# Patient Record
Sex: FEMALE | Race: WHITE | NOT HISPANIC OR LATINO | Employment: FULL TIME | URBAN - METROPOLITAN AREA
[De-identification: names, ages, dates, MRNs, and addresses within clinical notes are randomized per-mention and may not be internally consistent; named-entity substitution may affect disease eponyms.]

---

## 2023-06-04 ENCOUNTER — HOSPITAL ENCOUNTER (EMERGENCY)
Facility: HOSPITAL | Age: 28
Discharge: HOME | End: 2023-06-04
Attending: EMERGENCY MEDICINE | Admitting: EMERGENCY MEDICINE
Payer: COMMERCIAL

## 2023-06-04 VITALS
HEART RATE: 94 BPM | OXYGEN SATURATION: 100 % | HEIGHT: 60 IN | RESPIRATION RATE: 16 BRPM | WEIGHT: 132.5 LBS | BODY MASS INDEX: 26.01 KG/M2 | SYSTOLIC BLOOD PRESSURE: 119 MMHG | DIASTOLIC BLOOD PRESSURE: 75 MMHG | TEMPERATURE: 97.9 F

## 2023-06-04 DIAGNOSIS — K14.6 TONGUE PAIN: Primary | ICD-10-CM

## 2023-06-04 PROCEDURE — 99282 EMERGENCY DEPT VISIT SF MDM: CPT

## 2023-06-04 RX ORDER — CLINDAMYCIN HYDROCHLORIDE 300 MG/1
300 CAPSULE ORAL 3 TIMES DAILY
Qty: 21 CAPSULE | Refills: 0 | Status: SHIPPED | OUTPATIENT
Start: 2023-06-04 | End: 2023-06-04 | Stop reason: SDUPTHER

## 2023-06-04 RX ORDER — CLINDAMYCIN HYDROCHLORIDE 300 MG/1
300 CAPSULE ORAL 3 TIMES DAILY
Qty: 21 CAPSULE | Refills: 0 | Status: SHIPPED | OUTPATIENT
Start: 2023-06-04

## 2023-06-04 NOTE — ED PROVIDER NOTES
Subjective   History of Present Illness  28-year-old female states she woke up at 2 AM on Saturday and had moderate tongue swelling with mild pain.  Patient states the tongue swelling is improved significantly.  Patient denies any known injury or known exposure to irritants but states she has some persistent pain and mild swelling underneath the left aspect of the tongue.  Patient has also had a 1 week history of mild pharyngitis with some congestion and ear pain but no fever.    Review of Systems   Constitutional: Negative.    HENT:          As per HPI     No past medical history on file.    Allergies   Allergen Reactions    Amoxicillin Anaphylaxis    Penicillins Anaphylaxis       No past surgical history on file.    No family history on file.    Social History     Socioeconomic History    Marital status:            Objective   Physical Exam  Constitutional:       Appearance: Normal appearance.   HENT:      Head: Normocephalic and atraumatic.      Right Ear: Tympanic membrane normal.      Left Ear: Tympanic membrane normal.      Mouth/Throat:      Comments: Posterior pharynx without any erythema or edema or exudate, tongue with mild swelling on the left ventral aspect.  This is mildly erythematous and tender.  There is no fluctuance or induration.  I see no sublingual swelling, no trismus, no wounds.  No submandibular swelling.  Musculoskeletal:      Cervical back: Normal range of motion and neck supple.   Lymphadenopathy:      Cervical: No cervical adenopathy.   Neurological:      Mental Status: She is alert.       Procedures           ED Course                                           Medical Decision Making  Problems Addressed:  Tongue pain: acute illness or injury     Details: Differential would include irritant versus infection though there are other very unlikely possibilities.  I discussed treatment and differential with patient including a trial of antibiotics in case there was an occult injury that  created a bacterial infection.  Patient understands if she has any worsening of symptoms including any increased pain or swelling or fever to return immediately.  Patient also understands if she does not have complete resolution of symptoms to follow-up with ENT as recommended.        Final diagnoses:   Tongue pain       ED Disposition  ED Disposition       ED Disposition   Discharge    Condition   Stable    Comment   --               [facility]  [address]  [facility] [address]  [phone]    As needed         Medication List        New Prescriptions      clindamycin 300 MG capsule  Commonly known as: CLEOCIN  Take 1 capsule by mouth 3 (Three) Times a Day.               Where to Get Your Medications        These medications were sent to [facility] - [place], [place] - [address] - [phone]  - [phone]   [address] [place] [address]      Phone: [phone]   clindamycin 300 MG capsule            Marcin Stevens MD  06/04/23 5923